# Patient Record
Sex: FEMALE | Race: WHITE | NOT HISPANIC OR LATINO | URBAN - METROPOLITAN AREA
[De-identification: names, ages, dates, MRNs, and addresses within clinical notes are randomized per-mention and may not be internally consistent; named-entity substitution may affect disease eponyms.]

---

## 2021-06-28 ENCOUNTER — APPOINTMENT (RX ONLY)
Dept: URBAN - METROPOLITAN AREA CLINIC 12 | Facility: CLINIC | Age: 26
Setting detail: DERMATOLOGY
End: 2021-06-28

## 2021-06-28 DIAGNOSIS — Z41.9 ENCOUNTER FOR PROCEDURE FOR PURPOSES OTHER THAN REMEDYING HEALTH STATE, UNSPECIFIED: ICD-10-CM

## 2021-06-28 PROCEDURE — ? BOTOX

## 2021-06-28 NOTE — PROCEDURE: BOTOX
Periorbital Skin Units: 0
Consent: Written consent was obtained prior to the procedure. Risks, benefits, expectations and alternatives were discussed including, but not limited to, infection, bleeding, lid/brow ptosis, bruising, swelling, diplopia, temporary effects, incomplete chemical denervation and dissatisfaction with the cosmetic outcome. No guarantee or warranty was given or implied regarding longevity of results.
Postcare Instructions: Patient instructed to not lie down for 4 hours and limit physical activity for 24 hours. Patient instructed not to travel by airplane for 48 hours.
Additional Area 2 Location: masseter
Dilution (U/0.1 Cc): 5
Bill Summary Price Listed Below, Or Bill Total Of Units X Price Per Unit?: Bill Summary Price Below
Detail Level: Detailed
Map Statment: See attached map for complete details
Additional Area 2 Units: 10
Reconstitution Date: 6/22/21
Show Price In Note?: yes
Expiration Date (Month Year): 9/23
Price Per Unit In $ (Use Numbers Only, No Text Please.): 14
Additional Area 1 Location: chin
Administered By (Optional): DAREN Zazueta
Lot #: [id number]
Use Map Statement For Sites (Optional): No
Summary Price $ (Use Numbers Only, No Text Please.): 336
Glabellar Complex Units: 15

## 2022-07-05 ENCOUNTER — APPOINTMENT (RX ONLY)
Dept: URBAN - METROPOLITAN AREA CLINIC 12 | Facility: CLINIC | Age: 27
Setting detail: DERMATOLOGY
End: 2022-07-05

## 2022-07-05 DIAGNOSIS — Z41.9 ENCOUNTER FOR PROCEDURE FOR PURPOSES OTHER THAN REMEDYING HEALTH STATE, UNSPECIFIED: ICD-10-CM

## 2022-07-05 PROCEDURE — ? BOTOX

## 2022-07-05 PROCEDURE — ? RECOMMENDATIONS

## 2022-07-05 PROCEDURE — ? COSMETIC CONSULTATION - MICRO-NEEDLING

## 2022-07-05 NOTE — PROCEDURE: RECOMMENDATIONS
Detail Level: Zone
Recommendations (Free Text): Revision Intellishade Matte, TruPhysical, Elta Active, SkinMedica AHA BHA cleanser, Instant Bright Eye, SkinBetter TechnoNeck
Render Risk Assessment In Note?: no
Recommendation Preamble: The following recommendations were made during the visit:

## 2022-07-05 NOTE — PROCEDURE: BOTOX
Periorbital Skin Units: 10
Consent: Written consent was obtained prior to the procedure. Risks, benefits, expectations and alternatives were discussed including, but not limited to, infection, bleeding, lid/brow ptosis, bruising, swelling, diplopia, temporary effects, incomplete chemical denervation and dissatisfaction with the cosmetic outcome. No guarantee or warranty was given or implied regarding longevity of results.
Postcare Instructions: Patient instructed to not lie down for 4 hours and limit physical activity for 24 hours. Patient instructed not to travel by airplane for 48 hours.
Additional Area 2 Location: masseter
Dilution (U/0.1 Cc): 5
Bill Summary Price Listed Below, Or Bill Total Of Units X Price Per Unit?: Bill Summary Price Below
Detail Level: Detailed
Map Statment: See attached map for complete details
Nasal Root Units: 0
Show Price In Note?: yes
Expiration Date (Month Year): 10/23
Price Per Unit In $ (Use Numbers Only, No Text Please.): 14
Additional Area 1 Location: chin
Administered By (Optional): DAREN Zazueta
Lot #: [id number]
Use Map Statement For Sites (Optional): No
Summary Price $ (Use Numbers Only, No Text Please.): 360
Glabellar Complex Units: 15

## 2022-10-13 ENCOUNTER — APPOINTMENT (RX ONLY)
Dept: URBAN - METROPOLITAN AREA CLINIC 12 | Facility: CLINIC | Age: 27
Setting detail: DERMATOLOGY
End: 2022-10-13

## 2022-10-13 DIAGNOSIS — Z41.9 ENCOUNTER FOR PROCEDURE FOR PURPOSES OTHER THAN REMEDYING HEALTH STATE, UNSPECIFIED: ICD-10-CM

## 2022-10-13 PROCEDURE — ? CONSULTATION - FILLERS

## 2022-10-13 PROCEDURE — ? BOTOX

## 2022-10-13 NOTE — PROCEDURE: BOTOX
Periorbital Skin Units: 10
Consent: Written consent was obtained prior to the procedure. Risks, benefits, expectations and alternatives were discussed including, but not limited to, infection, bleeding, lid/brow ptosis, bruising, swelling, diplopia, temporary effects, incomplete chemical denervation and dissatisfaction with the cosmetic outcome. No guarantee or warranty was given or implied regarding longevity of results.
Postcare Instructions: Patient instructed to not lie down for 4 hours and limit physical activity for 24 hours. Patient instructed not to travel by airplane for 48 hours.
Additional Area 2 Location: masseter
Dilution (U/0.1 Cc): 5
Bill Summary Price Listed Below, Or Bill Total Of Units X Price Per Unit?: Bill #Units x Price Per Unit
Detail Level: Detailed
Map Statment: See attached map for complete details
Nasal Root Units: 0
Show Price In Note?: yes
Expiration Date (Month Year): 7/24
Price Per Unit In $ (Use Numbers Only, No Text Please.): 12
Additional Area 1 Location: chin
Administered By (Optional): DAREN Zazueta
Lot #: [id number]
Use Map Statement For Sites (Optional): No
Glabellar Complex Units: 15

## 2022-10-13 NOTE — PROCEDURE: CONSULTATION - FILLERS
Labiomental Sulcus Primary Filler Volume In Cc (Estimated): 0
Additional Area 3 Location: perioral lines
Tear Troughs Primary Filler Volume In Cc (Estimated): 1
Tear Trough Filler (Additional): Federico
Send Procedure Quote As Charge: No
Additional Area 2 Location: knees
Additional Area 1 Location: glabellar
Additional Area 5 Location: dorsal hands
Detail Level: Detailed
Additional Area 4 Location: neck
Tear Trough Filler (Primary): Restylane Contour

## 2023-03-15 ENCOUNTER — APPOINTMENT (RX ONLY)
Dept: URBAN - METROPOLITAN AREA CLINIC 12 | Facility: CLINIC | Age: 28
Setting detail: DERMATOLOGY
End: 2023-03-15

## 2023-03-15 DIAGNOSIS — Z41.9 ENCOUNTER FOR PROCEDURE FOR PURPOSES OTHER THAN REMEDYING HEALTH STATE, UNSPECIFIED: ICD-10-CM

## 2023-03-15 PROCEDURE — ? BOTOX

## 2023-03-15 PROCEDURE — ? ADDITIONAL NOTES

## 2023-03-15 PROCEDURE — ? CONSULTATION - COMPREHENSIVE FACIAL ANALYSIS

## 2023-03-15 PROCEDURE — ? PHOTOS OBTAINED

## 2023-03-15 PROCEDURE — ? RESTYLANE CONTOUR INJECTION

## 2023-03-15 ASSESSMENT — LOCATION DETAILED DESCRIPTION DERM
LOCATION DETAILED: LEFT INFERIOR ANTERIOR NECK
LOCATION DETAILED: LEFT SUPERIOR MEDIAL FOREHEAD
LOCATION DETAILED: RIGHT INFERIOR CENTRAL MALAR CHEEK
LOCATION DETAILED: LEFT INFERIOR CENTRAL MALAR CHEEK

## 2023-03-15 ASSESSMENT — LOCATION SIMPLE DESCRIPTION DERM
LOCATION SIMPLE: LEFT FOREHEAD
LOCATION SIMPLE: RIGHT CHEEK
LOCATION SIMPLE: LEFT CHEEK
LOCATION SIMPLE: LEFT ANTERIOR NECK

## 2023-03-15 ASSESSMENT — LOCATION ZONE DERM
LOCATION ZONE: FACE
LOCATION ZONE: NECK

## 2023-03-15 NOTE — PROCEDURE: BOTOX
Periorbital Skin Units: 10
Consent: Written consent was obtained prior to the procedure. Risks, benefits, expectations and alternatives were discussed including, but not limited to, infection, bleeding, lid/brow ptosis, bruising, swelling, diplopia, temporary effects, incomplete chemical denervation and dissatisfaction with the cosmetic outcome. No guarantee or warranty was given or implied regarding longevity of results.
Postcare Instructions: Patient instructed to not lie down for 4 hours and limit physical activity for 24 hours. Patient instructed not to travel by airplane for 48 hours.
Additional Area 2 Location: masseter
Dilution (U/0.1 Cc): 5
Bill Summary Price Listed Below, Or Bill Total Of Units X Price Per Unit?: Bill #Units x Price Per Unit
Detail Level: Detailed
Map Statment: See attached map for complete details
Nasal Root Units: 0
Show Price In Note?: yes
Expiration Date (Month Year): 4/25
Price Per Unit In $ (Use Numbers Only, No Text Please.): 12
Additional Area 1 Location: chin
Administered By (Optional): DAREN Zazueta
Lot #: [id number]
Use Map Statement For Sites (Optional): No
Glabellar Complex Units: 15

## 2023-03-15 NOTE — PROCEDURE: CONSULTATION - COMPREHENSIVE FACIAL ANALYSIS
Send Procedure Quote As Charge: No
Trichion Position: Normal
Occlusion: Class I
Lip Commissure Positions: Level
Realistic Expectations?: Yes
Mentocervical Angle: Obtuse
Detail Level: Detailed

## 2023-03-15 NOTE — PROCEDURE: PHOTOS OBTAINED
Photographed Locations: Photos were obtained of the patient.
Detail Level: Detailed
Follow-Up For Additional Photos?: no

## 2023-03-15 NOTE — PROCEDURE: RESTYLANE CONTOUR INJECTION
Use Map Statement For Sites (Optional): No
Lateral Face Filler Volume In Cc: 0
Additional Anesthesia Volume In Cc: 6
Consent: Written consent obtained. Risks include but not limited to bruising, beading, irregular texture, ulceration, infection, allergic reaction, scar formation, incomplete augmentation, temporary nature, procedural pain.
Anesthesia Volume In Cc: 0.5
Number Of Syringes (Required For Inventory): 1
Post-Care Instructions: Patient instructed to apply ice to reduce swelling.
Expiration Date (Month Year): 10/23
Additional Area 1 Location: submalar
Filler: Restylane Contour
Anesthesia Type: 1% lidocaine with epinephrine
Lot #: [id number]
Procedural Text: The filler was administered to the treatment areas noted above.
Detail Level: Detailed
Price (Use Numbers Only, No Special Characters Or $): 856
Map Statement: See Attach Map for Complete Details

## 2023-04-27 ENCOUNTER — APPOINTMENT (RX ONLY)
Dept: URBAN - METROPOLITAN AREA CLINIC 12 | Facility: CLINIC | Age: 28
Setting detail: DERMATOLOGY
End: 2023-04-27

## 2023-04-27 DIAGNOSIS — Z41.9 ENCOUNTER FOR PROCEDURE FOR PURPOSES OTHER THAN REMEDYING HEALTH STATE, UNSPECIFIED: ICD-10-CM

## 2023-04-27 PROCEDURE — ? BOTOX

## 2023-04-27 PROCEDURE — ? RESTYLANE CONTOUR INJECTION

## 2023-04-27 PROCEDURE — ? ADDITIONAL NOTES

## 2023-04-27 NOTE — PROCEDURE: RESTYLANE CONTOUR INJECTION
Dorsal Hands Filler Volume In Cc: 0
Additional Anesthesia Volume In Cc: 6
Price (Use Numbers Only, No Special Characters Or $): 100
Consent: Written consent obtained. Risks include but not limited to bruising, beading, irregular texture, ulceration, infection, allergic reaction, scar formation, incomplete augmentation, temporary nature, procedural pain.
Anesthesia Volume In Cc: 0.5
Additional Area 2 Location: malar cheek
Post-Care Instructions: Patient instructed to apply ice to reduce swelling.
Cheeks Filler Volume In Cc: 1
Expiration Date (Month Year): 3/23
Detail Level: Detailed
Include Cannula Information In Note?: No
Additional Area 1 Location: submalar
Anesthesia Type: 1% lidocaine with epinephrine
Map Statement: See Attach Map for Complete Details
Lot #: [id number]
Procedural Text: The filler was administered to the treatment areas noted above.
Filler: Restylane Contour

## 2023-04-27 NOTE — PROCEDURE: ADDITIONAL NOTES
Additional Notes: Restylane contour [facility] [id number]
Render Risk Assessment In Note?: no

## 2023-04-27 NOTE — PROCEDURE: BOTOX
Perioral Units: 0
Additional Area 3 Location: perioral
Detail Level: Simple
Use Map Statement For Sites (Optional): No
Expiration Date (Month Year): 4/25
Additional Area 2 Location: gummy smile
Bill Summary Price Listed Below, Or Bill Total Of Units X Price Per Unit?: Bill #Units x Price Per Unit
Dilution (U/0.1 Cc): 5
Additional Area 1 Location: chin
Reconstitution Date: 04/27/2023
Administered By (Optional): Beronica Maldonado PA-C
Show Price In Note?: yes
Postcare Instructions: Patient instructed to not lie down for 4 hours and limit physical activity for 24 hours. Patient instructed not to travel by airplane for 48 hours.
Lot #: [id number]
Consent: Written consent was obtained prior to the procedure. Risks, benefits, expectations and alternatives were discussed including, but not limited to, infection, bleeding, lid/brow ptosis, bruising, swelling, diplopia, temporary effects, incomplete chemical denervation and dissatisfaction with the cosmetic outcome. No guarantee or warranty was given or implied regarding longevity of results.
Map Statment: See attached map for complete details

## 2023-07-12 ENCOUNTER — APPOINTMENT (RX ONLY)
Dept: URBAN - METROPOLITAN AREA CLINIC 12 | Facility: CLINIC | Age: 28
Setting detail: DERMATOLOGY
End: 2023-07-12

## 2023-07-12 DIAGNOSIS — Z41.1 ENCOUNTER FOR COSMETIC SURGERY: ICD-10-CM

## 2023-07-12 PROCEDURE — ? BOTOX

## 2023-07-12 NOTE — PROCEDURE: BOTOX
Additional Area 3 Location: masseter
Perioral Units: 0
Lot #: [id number]
Use Map Statement For Sites (Optional): No
Detail Level: Simple
Map Statment: See attached map for complete details
Periorbital Skin Units: 10
Postcare Instructions: Patient instructed to not lie down for 4 hours and limit physical activity for 24 hours. Patient instructed not to travel by airplane for 48 hours.
Administered By (Optional): Beronica Maldonado PA-C
Show Price In Note?: yes
Bill Summary Price Listed Below, Or Bill Total Of Units X Price Per Unit?: Bill #Units x Price Per Unit
Reconstitution Date: 07/12/2023
Dilution (U/0.1 Cc): 5
Price Per Unit In $ (Use Numbers Only, No Text Please.): 12
Consent: Written consent was obtained prior to the procedure. Risks, benefits, expectations and alternatives were discussed including, but not limited to, infection, bleeding, lid/brow ptosis, bruising, swelling, diplopia, temporary effects, incomplete chemical denervation and dissatisfaction with the cosmetic outcome. No guarantee or warranty was given or implied regarding longevity of results.
Additional Area 2 Location: platysma
Additional Area 1 Location: chin
Glabellar Complex Units: 20
Expiration Date (Month Year): 09/25

## 2023-09-27 ENCOUNTER — APPOINTMENT (RX ONLY)
Dept: URBAN - METROPOLITAN AREA CLINIC 12 | Facility: CLINIC | Age: 28
Setting detail: DERMATOLOGY
End: 2023-09-27

## 2023-09-27 DIAGNOSIS — Z41.1 ENCOUNTER FOR COSMETIC SURGERY: ICD-10-CM

## 2023-09-27 PROCEDURE — ? BOTOX

## 2023-09-27 NOTE — PROCEDURE: BOTOX
Additional Area 3 Location: masseter
Perioral Units: 0
Lot #: [id number]
Use Map Statement For Sites (Optional): No
Detail Level: Simple
Additional Area 1 Units: 5
Map Statment: See attached map for complete details
Periorbital Skin Units: 10
Postcare Instructions: Patient instructed to not lie down for 4 hours and limit physical activity for 24 hours. Patient instructed not to travel by airplane for 48 hours.
Administered By (Optional): Beronica Maldonado PA-C
Show Price In Note?: yes
Bill Summary Price Listed Below, Or Bill Total Of Units X Price Per Unit?: Bill #Units x Price Per Unit
Reconstitution Date: 9/27/25
Price Per Unit In $ (Use Numbers Only, No Text Please.): 12
Consent: Written consent was obtained prior to the procedure. Risks, benefits, expectations and alternatives were discussed including, but not limited to, infection, bleeding, lid/brow ptosis, bruising, swelling, diplopia, temporary effects, incomplete chemical denervation and dissatisfaction with the cosmetic outcome. No guarantee or warranty was given or implied regarding longevity of results.
Additional Area 2 Location: platysma
Additional Area 1 Location: chin
Glabellar Complex Units: 20
Expiration Date (Month Year): 12/25

## 2023-10-09 ENCOUNTER — APPOINTMENT (RX ONLY)
Dept: URBAN - METROPOLITAN AREA CLINIC 12 | Facility: CLINIC | Age: 28
Setting detail: DERMATOLOGY
End: 2023-10-09

## 2023-10-09 DIAGNOSIS — Z42.8 ENCOUNTER FOR OTHER PLASTIC AND RECONSTRUCTIVE SURGERY FOLLOWING MEDICAL PROCEDURE OR HEALED INJURY: ICD-10-CM

## 2023-10-09 PROCEDURE — ? ADDITIONAL NOTES

## 2023-10-09 NOTE — PROCEDURE: ADDITIONAL NOTES
Render Risk Assessment In Note?: no
Additional Notes: Touch up \\nBotox 2.5 units \\nLot#: [id number] \\nExp:5/31/26

## 2023-12-12 ENCOUNTER — APPOINTMENT (RX ONLY)
Dept: URBAN - METROPOLITAN AREA CLINIC 12 | Facility: CLINIC | Age: 28
Setting detail: DERMATOLOGY
End: 2023-12-12

## 2023-12-12 DIAGNOSIS — Z41.1 ENCOUNTER FOR COSMETIC SURGERY: ICD-10-CM

## 2023-12-12 PROCEDURE — ? BOTOX

## 2023-12-12 PROCEDURE — ? JUVEDERM ULTRA INJECTION

## 2023-12-12 NOTE — PROCEDURE: BOTOX
Additional Area 3 Location: masseter
Perioral Units: 0
Lot #: [id number]
Use Map Statement For Sites (Optional): No
Detail Level: Simple
Map Statment: See attached map for complete details
Periorbital Skin Units: 5
Postcare Instructions: Patient instructed to not lie down for 4 hours and limit physical activity for 24 hours. Patient instructed not to travel by airplane for 48 hours.
Administered By (Optional): Beronica Maldonado PA-C
Show Price In Note?: yes
Bill Summary Price Listed Below, Or Bill Total Of Units X Price Per Unit?: Bill #Units x Price Per Unit
Reconstitution Date: 12/12/23
Forehead Units: 10
Price Per Unit In $ (Use Numbers Only, No Text Please.): 12
Consent: Written consent was obtained prior to the procedure. Risks, benefits, expectations and alternatives were discussed including, but not limited to, infection, bleeding, lid/brow ptosis, bruising, swelling, diplopia, temporary effects, incomplete chemical denervation and dissatisfaction with the cosmetic outcome. No guarantee or warranty was given or implied regarding longevity of results.
Additional Area 2 Location: platysma
Additional Area 1 Location: chin
Glabellar Complex Units: 15
Expiration Date (Month Year): 2/26

## 2023-12-12 NOTE — PROCEDURE: JUVEDERM ULTRA INJECTION
Cheeks Filler Volume In Cc: 0
Reconstitution Date: 12/12/23
Map Statement: See Attach Map for Complete Details
Anesthesia Volume In Cc: 0.5
Detail Level: Detailed
Post-Care Instructions: Patient instructed to apply ice to reduce swelling.
Include Cannula Information In Note?: No
Expiration Date (Month Year): 10/24
Filler: Juvederm Ultra
Price (Use Numbers Only, No Special Characters Or $): 395
Procedural Text: The filler was administered to the treatment areas noted above.
Number Of Syringes (Required For Inventory): 1
Lot #: [id number]
Anesthesia Type: 1% lidocaine with epinephrine
Additional Area 1 Location: perioral lines
Additional Anesthesia Volume In Cc: 6
Consent: Written consent obtained. Risks include but not limited to bruising, beading, irregular texture, ulceration, infection, allergic reaction, scar formation, incomplete augmentation, temporary nature, procedural pain.

## 2024-01-10 ENCOUNTER — APPOINTMENT (RX ONLY)
Dept: URBAN - METROPOLITAN AREA CLINIC 12 | Facility: CLINIC | Age: 29
Setting detail: DERMATOLOGY
End: 2024-01-10

## 2024-01-10 DIAGNOSIS — Z41.9 ENCOUNTER FOR PROCEDURE FOR PURPOSES OTHER THAN REMEDYING HEALTH STATE, UNSPECIFIED: ICD-10-CM

## 2024-01-10 PROCEDURE — ? OTHER (COSMETIC)

## 2024-01-10 NOTE — PROCEDURE: OTHER (COSMETIC)
Detail Level: Zone
Other (Free Text): No charge, Influencer
Sticky Other (Free Text): Juvederm Skinvive XC 2ccs\\n2 syringes used\\nLot: [id number]\\nExp: 5/8/24
Price $ (Use Numbers Only, No Text Please.): 0

## 2024-07-18 ENCOUNTER — APPOINTMENT (RX ONLY)
Dept: URBAN - METROPOLITAN AREA CLINIC 12 | Facility: CLINIC | Age: 29
Setting detail: DERMATOLOGY
End: 2024-07-18

## 2024-07-18 DIAGNOSIS — Z41.9 ENCOUNTER FOR PROCEDURE FOR PURPOSES OTHER THAN REMEDYING HEALTH STATE, UNSPECIFIED: ICD-10-CM

## 2024-07-18 PROCEDURE — ? DAXXIFY

## 2024-07-18 NOTE — PROCEDURE: DAXXIFY
Platsyma Units: 0
Show Glabellar Units: Yes
Detail Level: Detailed
Periorbital Skin Units: 12.5
Show Mentalis Units: No
Glabellar Complex Units: 17.5
Bill Summary Price Listed Below, Or Bill Total Of Units X Price Per Unit?: Bill #Units x Price Per Unit
Comments: Touch up no charge
Price Per Unit In $ (Use Numbers Only, No Text Please.): 15
Summary Price $ (Use Numbers Only, No Text Please.): 600
Dilution (U/0.1 Cc): 4
Lot #: [id number]
Patient Specific Comments (Will Not Stick From Patient To Patient): [employer] Discount
Expiration Date (Month Year): 3/25
Consent: Written consent obtained. Risks include but not limited to lid/brow ptosis, bruising, swelling, diplopia, temporary effect, incomplete chemical denervation.
Forehead Units: 10
Post-Care Instructions: Patient instructed to not lie down for 4 hours and limit physical activity for 24 hours.

## 2024-09-04 ENCOUNTER — APPOINTMENT (RX ONLY)
Dept: URBAN - METROPOLITAN AREA CLINIC 12 | Facility: CLINIC | Age: 29
Setting detail: DERMATOLOGY
End: 2024-09-04

## 2024-09-04 DIAGNOSIS — Z42.8 ENCOUNTER FOR OTHER PLASTIC AND RECONSTRUCTIVE SURGERY FOLLOWING MEDICAL PROCEDURE OR HEALED INJURY: ICD-10-CM

## 2024-09-04 PROCEDURE — ? ADDITIONAL NOTES

## 2024-09-04 PROCEDURE — ? BOTOX

## 2024-09-04 NOTE — PROCEDURE: ADDITIONAL NOTES
Render Risk Assessment In Note?: no
Additional Notes: Pt did not achieve desired results from Daxxify.

## 2024-09-04 NOTE — PROCEDURE: BOTOX
Periorbital Skin Units: 0
Show Price In Note?: yes
Postcare Instructions: Patient instructed to not lie down for 4 hours and limit physical activity for 24 hours. Patient instructed not to travel by airplane for 48 hours.
Map Statment: See attached map for complete details
Reconstitution Date: 09/04/2024
Administered By (Optional): Beronica Maldonado PA-C
Additional Area 3 Location: masseter
Lot #: [id number]
Detail Level: Simple
Use Map Statement For Sites (Optional): No
Consent: Written consent was obtained prior to the procedure. Risks, benefits, expectations and alternatives were discussed including, but not limited to, infection, bleeding, lid/brow ptosis, bruising, swelling, diplopia, temporary effects, incomplete chemical denervation and dissatisfaction with the cosmetic outcome. No guarantee or warranty was given or implied regarding longevity of results.
Additional Area 2 Location: gummy smile
Expiration Date (Month Year): 9/26
Glabellar Complex Units: 15
Forehead Units: 10
Additional Area 1 Location: chin
Dilution (U/0.1 Cc): 2.2
Bill Summary Price Listed Below, Or Bill Total Of Units X Price Per Unit?: Bill #Units x Price Per Unit